# Patient Record
Sex: FEMALE | Race: WHITE | NOT HISPANIC OR LATINO | Employment: OTHER | URBAN - METROPOLITAN AREA
[De-identification: names, ages, dates, MRNs, and addresses within clinical notes are randomized per-mention and may not be internally consistent; named-entity substitution may affect disease eponyms.]

---

## 2023-08-15 ENCOUNTER — HOSPITAL ENCOUNTER (OUTPATIENT)
Dept: DATA CONVERSION | Facility: HOSPITAL | Age: 73
Discharge: HOME | End: 2023-08-15

## 2023-08-15 DIAGNOSIS — M85.89 OTHER SPECIFIED DISORDERS OF BONE DENSITY AND STRUCTURE, MULTIPLE SITES: ICD-10-CM

## 2023-09-25 ENCOUNTER — LAB (OUTPATIENT)
Dept: LAB | Facility: LAB | Age: 73
End: 2023-09-25
Payer: MEDICARE

## 2023-09-25 ENCOUNTER — OFFICE VISIT (OUTPATIENT)
Dept: PRIMARY CARE | Facility: CLINIC | Age: 73
End: 2023-09-25
Payer: MEDICARE

## 2023-09-25 VITALS
HEIGHT: 62 IN | SYSTOLIC BLOOD PRESSURE: 122 MMHG | WEIGHT: 187.2 LBS | RESPIRATION RATE: 17 BRPM | BODY MASS INDEX: 34.45 KG/M2 | DIASTOLIC BLOOD PRESSURE: 80 MMHG | HEART RATE: 78 BPM | OXYGEN SATURATION: 98 %

## 2023-09-25 DIAGNOSIS — M19.90 OSTEOARTHRITIS, UNSPECIFIED OSTEOARTHRITIS TYPE, UNSPECIFIED SITE: ICD-10-CM

## 2023-09-25 DIAGNOSIS — R76.8 ANA POSITIVE: ICD-10-CM

## 2023-09-25 DIAGNOSIS — F17.210 TOBACCO DEPENDENCE DUE TO CIGARETTES: ICD-10-CM

## 2023-09-25 DIAGNOSIS — Z23 ENCOUNTER FOR IMMUNIZATION: ICD-10-CM

## 2023-09-25 DIAGNOSIS — E78.2 MIXED HYPERLIPIDEMIA: ICD-10-CM

## 2023-09-25 DIAGNOSIS — Z12.31 SCREENING MAMMOGRAM FOR BREAST CANCER: ICD-10-CM

## 2023-09-25 DIAGNOSIS — Z00.00 ROUTINE GENERAL MEDICAL EXAMINATION AT HEALTH CARE FACILITY: Primary | ICD-10-CM

## 2023-09-25 DIAGNOSIS — M06.4 UNDIFFERENTIATED INFLAMMATORY ARTHRITIS (MULTI): ICD-10-CM

## 2023-09-25 DIAGNOSIS — Z72.0 TOBACCO USE: ICD-10-CM

## 2023-09-25 PROBLEM — L40.0 PSORIASIS VULGARIS: Status: ACTIVE | Noted: 2022-10-14

## 2023-09-25 PROBLEM — L98.9 SKIN LESION: Status: RESOLVED | Noted: 2023-09-25 | Resolved: 2023-09-25

## 2023-09-25 PROBLEM — L23.7 POISON IVY DERMATITIS: Status: RESOLVED | Noted: 2023-09-25 | Resolved: 2023-09-25

## 2023-09-25 PROBLEM — S01.81XA FOREHEAD LACERATION, INITIAL ENCOUNTER: Status: ACTIVE | Noted: 2023-09-25

## 2023-09-25 PROBLEM — L23.7 POISON IVY DERMATITIS: Status: ACTIVE | Noted: 2023-09-25

## 2023-09-25 PROBLEM — D22.30 MELANOCYTIC NEVI OF UNSPECIFIED PART OF FACE: Status: ACTIVE | Noted: 2022-10-14

## 2023-09-25 PROBLEM — R22.9 LUMP OF SKIN: Status: RESOLVED | Noted: 2023-09-25 | Resolved: 2023-09-25

## 2023-09-25 PROBLEM — L81.4 OTHER MELANIN HYPERPIGMENTATION: Status: ACTIVE | Noted: 2022-10-14

## 2023-09-25 PROBLEM — N17.9 AKI (ACUTE KIDNEY INJURY) (CMS-HCC): Status: RESOLVED | Noted: 2023-09-25 | Resolved: 2023-09-25

## 2023-09-25 PROBLEM — M25.561 RIGHT KNEE PAIN: Status: ACTIVE | Noted: 2023-09-25

## 2023-09-25 PROBLEM — D18.01 HEMANGIOMA OF SKIN AND SUBCUTANEOUS TISSUE: Status: ACTIVE | Noted: 2022-10-14

## 2023-09-25 PROBLEM — R19.7 DIARRHEA: Status: RESOLVED | Noted: 2023-09-25 | Resolved: 2023-09-25

## 2023-09-25 PROBLEM — M85.89 OTHER SPECIFIED DISORDERS OF BONE DENSITY AND STRUCTURE, MULTIPLE SITES: Status: ACTIVE | Noted: 2023-09-25

## 2023-09-25 PROBLEM — E66.9 OBESITY: Status: ACTIVE | Noted: 2023-09-25

## 2023-09-25 PROBLEM — R22.9 LUMP OF SKIN: Status: ACTIVE | Noted: 2023-09-25

## 2023-09-25 PROBLEM — R22.32 LOCALIZED SWELLING OF BOTH HANDS: Status: ACTIVE | Noted: 2023-09-25

## 2023-09-25 PROBLEM — M79.604 PAIN OF RIGHT LOWER EXTREMITY: Status: ACTIVE | Noted: 2023-09-25

## 2023-09-25 PROBLEM — R09.89 DECREASED PULSES IN FEET: Status: ACTIVE | Noted: 2023-09-25

## 2023-09-25 PROBLEM — M79.606 LEG PAIN: Status: ACTIVE | Noted: 2023-09-25

## 2023-09-25 PROBLEM — M75.101 ROTATOR CUFF SYNDROME OF RIGHT SHOULDER: Status: ACTIVE | Noted: 2023-09-25

## 2023-09-25 PROBLEM — L91.8 OTHER HYPERTROPHIC DISORDERS OF THE SKIN: Status: ACTIVE | Noted: 2022-10-14

## 2023-09-25 PROBLEM — L98.9 SKIN LESION: Status: ACTIVE | Noted: 2023-09-25

## 2023-09-25 PROBLEM — L23.7 CONTACT DERMATITIS DUE TO POISON OAK: Status: RESOLVED | Noted: 2023-09-25 | Resolved: 2023-09-25

## 2023-09-25 PROBLEM — L23.7 CONTACT DERMATITIS DUE TO POISON OAK: Status: ACTIVE | Noted: 2023-09-25

## 2023-09-25 PROBLEM — M25.511 RIGHT SHOULDER PAIN: Status: ACTIVE | Noted: 2023-09-25

## 2023-09-25 PROBLEM — S05.00XA CORNEAL ABRASION: Status: ACTIVE | Noted: 2023-09-25

## 2023-09-25 PROBLEM — S01.81XA FACIAL LACERATION: Status: ACTIVE | Noted: 2023-09-25

## 2023-09-25 PROBLEM — N17.9 AKI (ACUTE KIDNEY INJURY) (CMS-HCC): Status: ACTIVE | Noted: 2023-09-25

## 2023-09-25 PROBLEM — F17.200 TOBACCO USE DISORDER: Status: ACTIVE | Noted: 2023-09-25

## 2023-09-25 PROBLEM — L01.00 IMPETIGO: Status: ACTIVE | Noted: 2023-09-25

## 2023-09-25 PROBLEM — R19.7 DIARRHEA: Status: ACTIVE | Noted: 2023-09-25

## 2023-09-25 PROBLEM — M79.643 HAND PAIN: Status: ACTIVE | Noted: 2023-09-25

## 2023-09-25 PROBLEM — R22.31 LOCALIZED SWELLING OF BOTH HANDS: Status: ACTIVE | Noted: 2023-09-25

## 2023-09-25 PROBLEM — L82.1 OTHER SEBORRHEIC KERATOSIS: Status: ACTIVE | Noted: 2022-10-14

## 2023-09-25 PROBLEM — M54.50 LOW BACK PAIN: Status: ACTIVE | Noted: 2023-09-25

## 2023-09-25 PROBLEM — S01.81XA FOREHEAD LACERATION, INITIAL ENCOUNTER: Status: RESOLVED | Noted: 2023-09-25 | Resolved: 2023-09-25

## 2023-09-25 PROBLEM — L25.9 DERMATITIS, CONTACT: Status: ACTIVE | Noted: 2023-09-25

## 2023-09-25 LAB
ALANINE AMINOTRANSFERASE (SGPT) (U/L) IN SER/PLAS: 14 U/L (ref 7–45)
ALBUMIN (G/DL) IN SER/PLAS: 3.9 G/DL (ref 3.4–5)
ALKALINE PHOSPHATASE (U/L) IN SER/PLAS: 64 U/L (ref 33–136)
ANION GAP IN SER/PLAS: 13 MMOL/L (ref 10–20)
ASPARTATE AMINOTRANSFERASE (SGOT) (U/L) IN SER/PLAS: 17 U/L (ref 9–39)
BASOPHILS (10*3/UL) IN BLOOD BY AUTOMATED COUNT: 0.08 X10E9/L (ref 0–0.1)
BASOPHILS/100 LEUKOCYTES IN BLOOD BY AUTOMATED COUNT: 1 % (ref 0–2)
BILIRUBIN TOTAL (MG/DL) IN SER/PLAS: 0.6 MG/DL (ref 0–1.2)
CALCIUM (MG/DL) IN SER/PLAS: 9.2 MG/DL (ref 8.6–10.3)
CARBON DIOXIDE, TOTAL (MMOL/L) IN SER/PLAS: 26 MMOL/L (ref 21–32)
CHLORIDE (MMOL/L) IN SER/PLAS: 105 MMOL/L (ref 98–107)
CHOLESTEROL (MG/DL) IN SER/PLAS: 189 MG/DL (ref 0–199)
CHOLESTEROL IN HDL (MG/DL) IN SER/PLAS: 49.9 MG/DL
CHOLESTEROL/HDL RATIO: 3.8
CREATININE (MG/DL) IN SER/PLAS: 0.93 MG/DL (ref 0.5–1.05)
EOSINOPHILS (10*3/UL) IN BLOOD BY AUTOMATED COUNT: 0.25 X10E9/L (ref 0–0.4)
EOSINOPHILS/100 LEUKOCYTES IN BLOOD BY AUTOMATED COUNT: 3.2 % (ref 0–6)
ERYTHROCYTE DISTRIBUTION WIDTH (RATIO) BY AUTOMATED COUNT: 12 % (ref 11.5–14.5)
ERYTHROCYTE MEAN CORPUSCULAR HEMOGLOBIN CONCENTRATION (G/DL) BY AUTOMATED: 32.4 G/DL (ref 32–36)
ERYTHROCYTE MEAN CORPUSCULAR VOLUME (FL) BY AUTOMATED COUNT: 92 FL (ref 80–100)
ERYTHROCYTES (10*6/UL) IN BLOOD BY AUTOMATED COUNT: 4.59 X10E12/L (ref 4–5.2)
GFR FEMALE: 64 ML/MIN/1.73M2
GLUCOSE (MG/DL) IN SER/PLAS: 83 MG/DL (ref 74–99)
HEMATOCRIT (%) IN BLOOD BY AUTOMATED COUNT: 42.3 % (ref 36–46)
HEMOGLOBIN (G/DL) IN BLOOD: 13.7 G/DL (ref 12–16)
IMMATURE GRANULOCYTES/100 LEUKOCYTES IN BLOOD BY AUTOMATED COUNT: 0.3 % (ref 0–0.9)
LDL: 111 MG/DL (ref 0–99)
LEUKOCYTES (10*3/UL) IN BLOOD BY AUTOMATED COUNT: 7.8 X10E9/L (ref 4.4–11.3)
LYMPHOCYTES (10*3/UL) IN BLOOD BY AUTOMATED COUNT: 2.18 X10E9/L (ref 0.8–3)
LYMPHOCYTES/100 LEUKOCYTES IN BLOOD BY AUTOMATED COUNT: 27.9 % (ref 13–44)
MONOCYTES (10*3/UL) IN BLOOD BY AUTOMATED COUNT: 0.51 X10E9/L (ref 0.05–0.8)
MONOCYTES/100 LEUKOCYTES IN BLOOD BY AUTOMATED COUNT: 6.5 % (ref 2–10)
NEUTROPHILS (10*3/UL) IN BLOOD BY AUTOMATED COUNT: 4.76 X10E9/L (ref 1.6–5.5)
NEUTROPHILS/100 LEUKOCYTES IN BLOOD BY AUTOMATED COUNT: 61.1 % (ref 40–80)
PLATELETS (10*3/UL) IN BLOOD AUTOMATED COUNT: 286 X10E9/L (ref 150–450)
POTASSIUM (MMOL/L) IN SER/PLAS: 4.2 MMOL/L (ref 3.5–5.3)
PROTEIN TOTAL: 6.5 G/DL (ref 6.4–8.2)
SODIUM (MMOL/L) IN SER/PLAS: 140 MMOL/L (ref 136–145)
TRIGLYCERIDE (MG/DL) IN SER/PLAS: 142 MG/DL (ref 0–149)
UREA NITROGEN (MG/DL) IN SER/PLAS: 16 MG/DL (ref 6–23)
VLDL: 28 MG/DL (ref 0–40)

## 2023-09-25 PROCEDURE — 1159F MED LIST DOCD IN RCRD: CPT | Performed by: STUDENT IN AN ORGANIZED HEALTH CARE EDUCATION/TRAINING PROGRAM

## 2023-09-25 PROCEDURE — 80061 LIPID PANEL: CPT

## 2023-09-25 PROCEDURE — 36415 COLL VENOUS BLD VENIPUNCTURE: CPT

## 2023-09-25 PROCEDURE — G0439 PPPS, SUBSEQ VISIT: HCPCS | Performed by: STUDENT IN AN ORGANIZED HEALTH CARE EDUCATION/TRAINING PROGRAM

## 2023-09-25 PROCEDURE — 99214 OFFICE O/P EST MOD 30 MIN: CPT | Performed by: STUDENT IN AN ORGANIZED HEALTH CARE EDUCATION/TRAINING PROGRAM

## 2023-09-25 PROCEDURE — 1160F RVW MEDS BY RX/DR IN RCRD: CPT | Performed by: STUDENT IN AN ORGANIZED HEALTH CARE EDUCATION/TRAINING PROGRAM

## 2023-09-25 PROCEDURE — G0008 ADMIN INFLUENZA VIRUS VAC: HCPCS | Performed by: STUDENT IN AN ORGANIZED HEALTH CARE EDUCATION/TRAINING PROGRAM

## 2023-09-25 PROCEDURE — 80053 COMPREHEN METABOLIC PANEL: CPT

## 2023-09-25 PROCEDURE — 90662 IIV NO PRSV INCREASED AG IM: CPT | Performed by: STUDENT IN AN ORGANIZED HEALTH CARE EDUCATION/TRAINING PROGRAM

## 2023-09-25 PROCEDURE — 1170F FXNL STATUS ASSESSED: CPT | Performed by: STUDENT IN AN ORGANIZED HEALTH CARE EDUCATION/TRAINING PROGRAM

## 2023-09-25 PROCEDURE — 85025 COMPLETE CBC W/AUTO DIFF WBC: CPT

## 2023-09-25 RX ORDER — ACETAMINOPHEN 500 MG
TABLET ORAL
COMMUNITY
Start: 2021-10-05

## 2023-09-25 RX ORDER — HYDROXYCHLOROQUINE SULFATE 200 MG/1
1 TABLET, FILM COATED ORAL 2 TIMES DAILY
COMMUNITY
End: 2023-12-13

## 2023-09-25 ASSESSMENT — ENCOUNTER SYMPTOMS
FREQUENCY: 1
SHORTNESS OF BREATH: 0
ABDOMINAL PAIN: 0
COUGH: 0
WOUND: 0
CONSTIPATION: 0
ARTHRALGIAS: 1

## 2023-09-25 ASSESSMENT — ACTIVITIES OF DAILY LIVING (ADL)
MANAGING_FINANCES: INDEPENDENT
BATHING: INDEPENDENT
DOING_HOUSEWORK: INDEPENDENT
DRESSING: INDEPENDENT
GROCERY_SHOPPING: INDEPENDENT
TAKING_MEDICATION: INDEPENDENT

## 2023-09-25 ASSESSMENT — PATIENT HEALTH QUESTIONNAIRE - PHQ9
2. FEELING DOWN, DEPRESSED OR HOPELESS: NOT AT ALL
1. LITTLE INTEREST OR PLEASURE IN DOING THINGS: NOT AT ALL
SUM OF ALL RESPONSES TO PHQ9 QUESTIONS 1 AND 2: 0

## 2023-09-25 NOTE — PROGRESS NOTES
"Subjective   Reason for Visit: Sabrina Alcazar is an 73 y.o. female here for a Medicare Wellness visit.          Reviewed all medications by prescribing practitioner or clinical pharmacist (such as prescriptions, OTCs, herbal therapies and supplements) and documented in the medical record.    Patient presents today for her Medicare wellness visit.  Patient states she is currently working with Dr. Lucio regarding her arthritis.  She did have a DEXA scan which was normal.  She was started on Plaquenil.  This has not had a benefit to managing her arthritis.  She has noticed a decrease in pain and a overall decrease in synovitis along multiple joints.  However, she is hesitant to continue the Plaquenil and possibly wants to discontinue it and try taking just Tylenol.      Patient also states that she is struggling maintaining her weight.  She has a lack of motivation when it comes to food in has been embarrassed to continue her exercising due to her generalized deconditioned state.  She was doing a walking program with the [employer] which she really enjoyed but stopped due to previously mentioned concerns.    Insurance did update her colonoscopy screening of fit test we will need to obtain those results        Patient Care Team:  Lisa Washington DO as PCP - General (Family Medicine)  Lisa Washington DO as PCP - [employer] PCP  Drew Hagen DO as Primary Care Provider     Review of Systems   HENT:  Negative for congestion.    Respiratory:  Negative for cough and shortness of breath.    Cardiovascular:  Negative for chest pain.   Gastrointestinal:  Negative for abdominal pain and constipation.   Genitourinary:  Positive for frequency.   Musculoskeletal:  Positive for arthralgias.   Skin:  Negative for rash and wound.       Objective   Vitals:  /80   Pulse 78   Resp 17   Ht 1.575 m (5' 2\")   Wt 84.9 kg (187 lb 3.2 oz)   SpO2 98%   BMI 34.24 kg/m²       Physical Exam  Vitals " reviewed.   Constitutional:       General: She is not in acute distress.     Appearance: Normal appearance. She is not ill-appearing.   HENT:      Right Ear: Tympanic membrane and ear canal normal.      Left Ear: Tympanic membrane and ear canal normal.      Mouth/Throat:      Mouth: Mucous membranes are moist.      Pharynx: Oropharynx is clear. No oropharyngeal exudate or posterior oropharyngeal erythema.   Eyes:      Extraocular Movements: Extraocular movements intact.      Conjunctiva/sclera: Conjunctivae normal.      Pupils: Pupils are equal, round, and reactive to light.   Neck:      Vascular: No carotid bruit.   Cardiovascular:      Rate and Rhythm: Normal rate and regular rhythm.      Heart sounds: No murmur heard.     No gallop.   Pulmonary:      Effort: Pulmonary effort is normal.      Breath sounds: Normal breath sounds. No wheezing, rhonchi or rales.   Abdominal:      General: Abdomen is flat. Bowel sounds are normal.      Palpations: Abdomen is soft.      Tenderness: There is no abdominal tenderness.   Musculoskeletal:      Cervical back: Neck supple.      Left lower leg: No edema.   Skin:     General: Skin is warm and dry.      Findings: No rash.   Neurological:      General: No focal deficit present.      Mental Status: She is alert and oriented to person, place, and time.      Gait: Gait normal.   Psychiatric:         Mood and Affect: Mood normal.         Behavior: Behavior normal.       Assessment/Plan   Problem List Items Addressed This Visit       JARRET positive    Relevant Orders    CBC and Auto Differential    Comprehensive metabolic panel    Mixed hyperlipidemia    Relevant Orders    CBC and Auto Differential    Comprehensive metabolic panel    Lipid Panel    Osteoarthritis    Relevant Orders    CBC and Auto Differential    Comprehensive metabolic panel     Other Visit Diagnoses       Routine general medical examination at health care facility    -  Primary    Encounter for immunization         Relevant Orders    Flu vaccine, quadrivalent, high-dose, preservative free, age 65y+ (FLUZONE)    Screening mammogram for breast cancer        Relevant Orders    BI mammo bilateral screening tomosynthesis               Inflammatory Arthritis   She initially felt much better with the medications, less synovitis   hydroxychloroquine 200mg BID  followed by rheum with Dr. Mirza  advised annual vision screening     Weight Gain   Offered nutritionist   Encouraged more movement and activity   Pt declined at this time.     HLD  ordered lipid panel      Tobacco Use  We did have an extensive discussion regarding smoking cessation with varies ways to help her quit, including gums, patches and Phamacological avenues. We also discussed proper diet that may improve his overall health and well being. WE advised the patient to make healthy food choices and trial a largely plant based diet. He was advised to avoid processed foods and sweets.        Health Maintenance  Mammogram ordered  influenza vaccine given today   colon ca screenin colonoscopy, insurance sent FIT testing this year, will try to obtain the results.     fu in 6 months    Please send copy to her hand

## 2023-10-04 ENCOUNTER — APPOINTMENT (OUTPATIENT)
Dept: RADIOLOGY | Facility: HOSPITAL | Age: 73
End: 2023-10-04
Payer: MEDICARE

## 2023-10-20 ENCOUNTER — HOSPITAL ENCOUNTER (OUTPATIENT)
Dept: RADIOLOGY | Facility: HOSPITAL | Age: 73
Discharge: HOME | End: 2023-10-20
Payer: MEDICARE

## 2023-10-20 DIAGNOSIS — F17.210 TOBACCO DEPENDENCE DUE TO CIGARETTES: ICD-10-CM

## 2023-10-20 DIAGNOSIS — Z72.0 TOBACCO USE: ICD-10-CM

## 2023-10-20 PROCEDURE — 71271 CT THORAX LUNG CANCER SCR C-: CPT | Mod: MG

## 2023-12-13 DIAGNOSIS — M06.4 UNDIFFERENTIATED INFLAMMATORY ARTHRITIS (MULTI): Primary | ICD-10-CM

## 2023-12-13 RX ORDER — HYDROXYCHLOROQUINE SULFATE 200 MG/1
TABLET, FILM COATED ORAL 2 TIMES DAILY
Qty: 60 TABLET | Refills: 5 | Status: SHIPPED | OUTPATIENT
Start: 2023-12-13 | End: 2024-01-02 | Stop reason: SDUPTHER

## 2024-01-02 DIAGNOSIS — M06.4 UNDIFFERENTIATED INFLAMMATORY ARTHRITIS (MULTI): ICD-10-CM

## 2024-01-02 RX ORDER — HYDROXYCHLOROQUINE SULFATE 200 MG/1
200 TABLET, FILM COATED ORAL 2 TIMES DAILY
Qty: 60 TABLET | Refills: 5 | Status: SHIPPED | OUTPATIENT
Start: 2024-01-02

## 2024-06-17 ENCOUNTER — OFFICE VISIT (OUTPATIENT)
Dept: RHEUMATOLOGY | Facility: CLINIC | Age: 74
End: 2024-06-17
Payer: MEDICARE

## 2024-06-17 VITALS
BODY MASS INDEX: 34.93 KG/M2 | DIASTOLIC BLOOD PRESSURE: 72 MMHG | HEIGHT: 61 IN | SYSTOLIC BLOOD PRESSURE: 128 MMHG | WEIGHT: 185 LBS

## 2024-06-17 DIAGNOSIS — L40.0 PSORIASIS VULGARIS: ICD-10-CM

## 2024-06-17 DIAGNOSIS — M06.4 UNDIFFERENTIATED INFLAMMATORY ARTHRITIS (MULTI): ICD-10-CM

## 2024-06-17 DIAGNOSIS — M19.90 OSTEOARTHRITIS, UNSPECIFIED OSTEOARTHRITIS TYPE, UNSPECIFIED SITE: Primary | ICD-10-CM

## 2024-06-17 PROCEDURE — 99214 OFFICE O/P EST MOD 30 MIN: CPT | Performed by: INTERNAL MEDICINE

## 2024-06-17 RX ORDER — HYDROXYCHLOROQUINE SULFATE 200 MG/1
200 TABLET, FILM COATED ORAL 2 TIMES DAILY
Qty: 60 TABLET | Refills: 5 | Status: SHIPPED | OUTPATIENT
Start: 2024-06-17

## 2024-06-17 NOTE — PROGRESS NOTES
Recheck  OA  /  UCTD  /  Inflammatory Arthritis   Good days, bad days    HPI - She has a lot of back/buttock stiffness and some pain, sometimes rad into her thigh.  She didn't go to PT.   She takes tylenol ES 2 tab bid with relief.  No other partic pain.  No swelling.  AM stiffness in back few min.  Sometimes walks bent over.  No CP, some ARREDONDO with exercise.  No GI.  Sees eye dr.      PE  NAD  RRR no r/m/g  CTA  Noedema  No synovitis  Sl red scaly area B shines and R olecranon <1% BSA    A/P - OA and inflam arthritis - back stiffness>pain  She saw derm and was told she ha Ps but didn't make recommendations.  She was not given any topicals.  ?if she could have PsA.  She is doing well on hcq and has no evidence of active inflammation.  Recommend she go back for Ps and for full body check  Nl DEXA 8/23  She declines PT  Reviewed CMP and CBC from primary 9/23  Reeval 1 yr or sooner PRN

## 2024-09-26 ENCOUNTER — HOSPITAL ENCOUNTER (OUTPATIENT)
Dept: RADIOLOGY | Facility: HOSPITAL | Age: 74
Discharge: HOME | End: 2024-09-26
Payer: MEDICARE

## 2024-09-26 ENCOUNTER — APPOINTMENT (OUTPATIENT)
Dept: PRIMARY CARE | Facility: CLINIC | Age: 74
End: 2024-09-26
Payer: MEDICARE

## 2024-09-26 ENCOUNTER — LAB (OUTPATIENT)
Dept: LAB | Facility: LAB | Age: 74
End: 2024-09-26
Payer: MEDICARE

## 2024-09-26 VITALS
DIASTOLIC BLOOD PRESSURE: 64 MMHG | HEART RATE: 70 BPM | HEIGHT: 63 IN | OXYGEN SATURATION: 96 % | BODY MASS INDEX: 32.43 KG/M2 | WEIGHT: 183 LBS | SYSTOLIC BLOOD PRESSURE: 126 MMHG

## 2024-09-26 VITALS — BODY MASS INDEX: 31.89 KG/M2 | HEIGHT: 63 IN | WEIGHT: 180 LBS

## 2024-09-26 DIAGNOSIS — Z86.39 H/O: OBESITY: ICD-10-CM

## 2024-09-26 DIAGNOSIS — E78.2 MIXED HYPERLIPIDEMIA: ICD-10-CM

## 2024-09-26 DIAGNOSIS — E66.09 CLASS 1 OBESITY DUE TO EXCESS CALORIES WITH SERIOUS COMORBIDITY AND BODY MASS INDEX (BMI) OF 32.0 TO 32.9 IN ADULT: ICD-10-CM

## 2024-09-26 DIAGNOSIS — M06.4 UNDIFFERENTIATED INFLAMMATORY ARTHRITIS (MULTI): ICD-10-CM

## 2024-09-26 DIAGNOSIS — Z00.00 ROUTINE GENERAL MEDICAL EXAMINATION AT HEALTH CARE FACILITY: Primary | ICD-10-CM

## 2024-09-26 DIAGNOSIS — Z12.31 ENCOUNTER FOR SCREENING MAMMOGRAM FOR MALIGNANT NEOPLASM OF BREAST: ICD-10-CM

## 2024-09-26 DIAGNOSIS — F17.210 TOBACCO DEPENDENCE DUE TO CIGARETTES: ICD-10-CM

## 2024-09-26 DIAGNOSIS — M54.32 SCIATICA OF LEFT SIDE: ICD-10-CM

## 2024-09-26 DIAGNOSIS — Z23 IMMUNIZATION DUE: ICD-10-CM

## 2024-09-26 DIAGNOSIS — F17.200 TOBACCO USE DISORDER: ICD-10-CM

## 2024-09-26 PROBLEM — S05.00XA CORNEAL ABRASION: Status: RESOLVED | Noted: 2023-09-25 | Resolved: 2024-09-26

## 2024-09-26 LAB
25(OH)D3 SERPL-MCNC: 27 NG/ML (ref 30–100)
ALBUMIN SERPL BCP-MCNC: 4 G/DL (ref 3.4–5)
ALP SERPL-CCNC: 75 U/L (ref 33–136)
ALT SERPL W P-5'-P-CCNC: 18 U/L (ref 7–45)
ANION GAP SERPL CALC-SCNC: 12 MMOL/L (ref 10–20)
AST SERPL W P-5'-P-CCNC: 20 U/L (ref 9–39)
BASOPHILS # BLD AUTO: 0.07 X10*3/UL (ref 0–0.1)
BASOPHILS NFR BLD AUTO: 0.8 %
BILIRUB SERPL-MCNC: 0.6 MG/DL (ref 0–1.2)
BUN SERPL-MCNC: 14 MG/DL (ref 6–23)
CALCIUM SERPL-MCNC: 9.4 MG/DL (ref 8.6–10.3)
CHLORIDE SERPL-SCNC: 105 MMOL/L (ref 98–107)
CHOLEST SERPL-MCNC: 187 MG/DL (ref 0–199)
CHOLESTEROL/HDL RATIO: 3.9
CO2 SERPL-SCNC: 27 MMOL/L (ref 21–32)
CREAT SERPL-MCNC: 0.95 MG/DL (ref 0.5–1.05)
EGFRCR SERPLBLD CKD-EPI 2021: 62 ML/MIN/1.73M*2
EOSINOPHIL # BLD AUTO: 0.27 X10*3/UL (ref 0–0.4)
EOSINOPHIL NFR BLD AUTO: 3.2 %
ERYTHROCYTE [DISTWIDTH] IN BLOOD BY AUTOMATED COUNT: 12 % (ref 11.5–14.5)
EST. AVERAGE GLUCOSE BLD GHB EST-MCNC: 97 MG/DL
GLUCOSE SERPL-MCNC: 88 MG/DL (ref 74–99)
HBA1C MFR BLD: 5 %
HCT VFR BLD AUTO: 43 % (ref 36–46)
HDLC SERPL-MCNC: 48.4 MG/DL
HGB BLD-MCNC: 14.5 G/DL (ref 12–16)
IMM GRANULOCYTES # BLD AUTO: 0.02 X10*3/UL (ref 0–0.5)
IMM GRANULOCYTES NFR BLD AUTO: 0.2 % (ref 0–0.9)
LDLC SERPL CALC-MCNC: 95 MG/DL
LYMPHOCYTES # BLD AUTO: 2.58 X10*3/UL (ref 0.8–3)
LYMPHOCYTES NFR BLD AUTO: 30.8 %
MCH RBC QN AUTO: 30.9 PG (ref 26–34)
MCHC RBC AUTO-ENTMCNC: 33.7 G/DL (ref 32–36)
MCV RBC AUTO: 92 FL (ref 80–100)
MONOCYTES # BLD AUTO: 0.5 X10*3/UL (ref 0.05–0.8)
MONOCYTES NFR BLD AUTO: 6 %
NEUTROPHILS # BLD AUTO: 4.93 X10*3/UL (ref 1.6–5.5)
NEUTROPHILS NFR BLD AUTO: 59 %
NON HDL CHOLESTEROL: 139 MG/DL (ref 0–149)
NRBC BLD-RTO: 0 /100 WBCS (ref 0–0)
PLATELET # BLD AUTO: 309 X10*3/UL (ref 150–450)
POTASSIUM SERPL-SCNC: 4.1 MMOL/L (ref 3.5–5.3)
PROT SERPL-MCNC: 6.4 G/DL (ref 6.4–8.2)
RBC # BLD AUTO: 4.7 X10*6/UL (ref 4–5.2)
SODIUM SERPL-SCNC: 140 MMOL/L (ref 136–145)
TRIGL SERPL-MCNC: 218 MG/DL (ref 0–149)
TSH SERPL-ACNC: 3.24 MIU/L (ref 0.44–3.98)
VLDL: 44 MG/DL (ref 0–40)
WBC # BLD AUTO: 8.4 X10*3/UL (ref 4.4–11.3)

## 2024-09-26 PROCEDURE — G0008 ADMIN INFLUENZA VIRUS VAC: HCPCS | Performed by: STUDENT IN AN ORGANIZED HEALTH CARE EDUCATION/TRAINING PROGRAM

## 2024-09-26 PROCEDURE — 80061 LIPID PANEL: CPT

## 2024-09-26 PROCEDURE — 77067 SCR MAMMO BI INCL CAD: CPT

## 2024-09-26 PROCEDURE — G0439 PPPS, SUBSEQ VISIT: HCPCS | Performed by: STUDENT IN AN ORGANIZED HEALTH CARE EDUCATION/TRAINING PROGRAM

## 2024-09-26 PROCEDURE — 1160F RVW MEDS BY RX/DR IN RCRD: CPT | Performed by: STUDENT IN AN ORGANIZED HEALTH CARE EDUCATION/TRAINING PROGRAM

## 2024-09-26 PROCEDURE — 80053 COMPREHEN METABOLIC PANEL: CPT

## 2024-09-26 PROCEDURE — 1123F ACP DISCUSS/DSCN MKR DOCD: CPT | Performed by: STUDENT IN AN ORGANIZED HEALTH CARE EDUCATION/TRAINING PROGRAM

## 2024-09-26 PROCEDURE — 1159F MED LIST DOCD IN RCRD: CPT | Performed by: STUDENT IN AN ORGANIZED HEALTH CARE EDUCATION/TRAINING PROGRAM

## 2024-09-26 PROCEDURE — 1170F FXNL STATUS ASSESSED: CPT | Performed by: STUDENT IN AN ORGANIZED HEALTH CARE EDUCATION/TRAINING PROGRAM

## 2024-09-26 PROCEDURE — 36415 COLL VENOUS BLD VENIPUNCTURE: CPT

## 2024-09-26 PROCEDURE — 82306 VITAMIN D 25 HYDROXY: CPT

## 2024-09-26 PROCEDURE — 90662 IIV NO PRSV INCREASED AG IM: CPT | Performed by: STUDENT IN AN ORGANIZED HEALTH CARE EDUCATION/TRAINING PROGRAM

## 2024-09-26 PROCEDURE — 84443 ASSAY THYROID STIM HORMONE: CPT

## 2024-09-26 PROCEDURE — 83036 HEMOGLOBIN GLYCOSYLATED A1C: CPT

## 2024-09-26 PROCEDURE — 99214 OFFICE O/P EST MOD 30 MIN: CPT | Performed by: STUDENT IN AN ORGANIZED HEALTH CARE EDUCATION/TRAINING PROGRAM

## 2024-09-26 PROCEDURE — 85025 COMPLETE CBC W/AUTO DIFF WBC: CPT

## 2024-09-26 RX ORDER — METHYLPREDNISOLONE 4 MG/1
TABLET ORAL
Qty: 21 TABLET | Refills: 0 | Status: SHIPPED | OUTPATIENT
Start: 2024-09-26 | End: 2024-10-03

## 2024-09-26 ASSESSMENT — ACTIVITIES OF DAILY LIVING (ADL)
DOING_HOUSEWORK: INDEPENDENT
MANAGING_FINANCES: INDEPENDENT
TAKING_MEDICATION: INDEPENDENT
DRESSING: INDEPENDENT
BATHING: INDEPENDENT
GROCERY_SHOPPING: INDEPENDENT

## 2024-09-26 NOTE — PROGRESS NOTES
History Of Present Illness  Sabrina Alcazar is a 74 y.o. female presents    Left leg sciatica - presented to urgent care.  Improving with muscle relaxant 8/12/24  Walking with a cane     Past Medical History  She has a past medical history of Asymptomatic menopausal state (06/17/2013), Encounter for screening for malignant neoplasm of vagina, Other conditions influencing health status, Pain in unspecified joint, Personal history of other complications of pregnancy, childbirth and the puerperium, Personal history of other diseases of the female genital tract, Personal history of other diseases of the musculoskeletal system and connective tissue, Personal history of other diseases of the musculoskeletal system and connective tissue, Personal history of other infectious and parasitic diseases, Personal history of other specified conditions, Unspecified urinary incontinence, and Urgency of urination.    Surgical History  She has a past surgical history that includes Dilation and curettage of uterus (10/08/2013); Colonoscopy (03/17/2014); and Mouth surgery (05/12/2014).     Social History  She reports that she has been smoking cigarettes. She has a 10 pack-year smoking history. She has never used smokeless tobacco. She reports current alcohol use. She reports that she does not use drugs.    Family History  Family History   Problem Relation Name Age of Onset   • No Known Problems Mother     • No Known Problems Father     • Ovarian cancer Sister          Allergies  Meperidine    Physical Exam  Vitals reviewed.   Constitutional:       General: She is not in acute distress.     Appearance: She is not ill-appearing.   HENT:      Right Ear: Tympanic membrane and ear canal normal.      Left Ear: Tympanic membrane and ear canal normal.      Mouth/Throat:      Mouth: Mucous membranes are moist.      Pharynx: Oropharynx is clear. No oropharyngeal exudate or posterior oropharyngeal erythema.   Eyes:      Extraocular Movements:  Extraocular movements intact.      Conjunctiva/sclera: Conjunctivae normal.      Pupils: Pupils are equal, round, and reactive to light.   Neck:      Vascular: No carotid bruit.   Cardiovascular:      Rate and Rhythm: Normal rate and regular rhythm.      Heart sounds: No murmur heard.     No gallop.   Pulmonary:      Effort: Pulmonary effort is normal.      Breath sounds: Normal breath sounds. No wheezing, rhonchi or rales.   Abdominal:      General: Abdomen is flat. Bowel sounds are normal.      Palpations: Abdomen is soft.      Tenderness: There is no abdominal tenderness.   Musculoskeletal:      Cervical back: Neck supple.      Left lower leg: No edema.   Skin:     General: Skin is warm and dry.      Findings: No rash.   Neurological:      General: No focal deficit present.      Mental Status: She is alert and oriented to person, place, and time.      Gait: Gait abnormal.   Psychiatric:         Mood and Affect: Mood normal.         Behavior: Behavior normal.        Last Recorded Vitals  /64   Pulse 70   Wt 83 kg (183 lb)   SpO2 96%     Relevant Results    Current Outpatient Medications:   •  acetaminophen (Tylenol Extra Strength) 500 mg tablet, Take by mouth., Disp: , Rfl:   •  hydroxychloroquine (Plaquenil) 200 mg tablet, Take 1 tablet (200 mg) by mouth 2 times a day., Disp: 60 tablet, Rfl: 5  •  methylPREDNISolone (Medrol Dospak) 4 mg tablets, Take as directed on package., Disp: 21 tablet, Rfl: 0           Assessment/Plan   Diagnoses and all orders for this visit:  Routine general medical examination at health care facility  Undifferentiated inflammatory arthritis (Multi)  -     Disability Placard  Sciatica of left side  -     Referral to Physical Therapy; Future  -     methylPREDNISolone (Medrol Dospak) 4 mg tablets; Take as directed on package.  Mixed hyperlipidemia  Class 1 obesity due to excess calories with serious comorbidity and body mass index (BMI) of 32.0 to 32.9 in adult  -     Comprehensive  Metabolic Panel; Future  -     Lipid Panel; Future  -     TSH with reflex to Free T4 if abnormal; Future  -     CBC and Auto Differential; Future  -     Vitamin D 25-Hydroxy,Total (for eval of Vitamin D levels); Future  -     Hemoglobin A1C; Future  Immunization due  -     Flu vaccine, trivalent, preservative free, HIGH-DOSE, age 65y+ (Fluzone)  Encounter for screening mammogram for malignant neoplasm of breast  -     BI mammo bilateral screening tomosynthesis; Future  Tobacco use disorder  -     CT lung screening low dose; Future  Tobacco dependence due to cigarettes  -     CT lung screening low dose; Future  H/O: obesity  -     TSH with reflex to Free T4 if abnormal; Future  -     Hemoglobin A1C; Future         Inflammatory Arthritis   She initially felt much better with the medications, less synovitis   hydroxychloroquine 200mg BID  followed by rheum with Dr. Mirza  advised annual vision screening     Weight Gain   Offered nutritionist   Encouraged more movement and activity   Pt declined at this time.      HLD  ordered lipid panel      Tobacco Use  We did have an extensive discussion regarding smoking cessation with varies ways to help her quit, including gums, patches and Phamacological avenues. We also discussed proper diet that may improve his overall health and well being. WE advised the patient to make healthy food choices and trial a largely plant based diet. He was advised to avoid processed foods and sweets.        Health Maintenance  Mammogram ordered  influenza vaccine given today   Dexa   CT chest due 10/21  colon ca screenin/24 fit testing completed      Health Maintenance   Topic Date Due   • Medicare Annual Wellness Visit (AWV)  Never done   • Hepatitis C Screening  Never done   • Hepatitis A Vaccines (1 of 2 - Risk 2-dose series) Never done   • Zoster Vaccines (1 of 2) Never done   • RSV Pregnant patients and/or  patients aged 60+ years (1 - 1-dose 60+ series) Never done   • DTaP/Tdap/Td  Vaccines (2 - Td or Tdap) 10/08/2023   • Influenza Vaccine (1) 09/01/2024   • COVID-19 Vaccine (5 - 2023-24 season) 09/01/2024   • Lipid Panel  09/25/2028   • Pneumococcal Vaccine: 65+ Years  Completed   • Bone Density Scan  Completed   • HIB Vaccines  Aged Out   • Hepatitis B Vaccines  Aged Out   • IPV Vaccines  Aged Out   • Meningococcal Vaccine  Aged Out   • Rotavirus Vaccines  Aged Out   • HPV Vaccines  Aged Out   • Colorectal Cancer Screening  Discontinued   • Irritable Bowel Syndrome  Discontinued            Lisa Washington, DO

## 2024-10-21 ENCOUNTER — EVALUATION (OUTPATIENT)
Dept: PHYSICAL THERAPY | Facility: CLINIC | Age: 74
End: 2024-10-21
Payer: MEDICARE

## 2024-10-21 DIAGNOSIS — G89.29 CHRONIC LEFT-SIDED LOW BACK PAIN WITHOUT SCIATICA: Primary | ICD-10-CM

## 2024-10-21 DIAGNOSIS — M54.32 SCIATICA OF LEFT SIDE: ICD-10-CM

## 2024-10-21 DIAGNOSIS — M54.50 CHRONIC LEFT-SIDED LOW BACK PAIN WITHOUT SCIATICA: Primary | ICD-10-CM

## 2024-10-21 PROCEDURE — 97110 THERAPEUTIC EXERCISES: CPT | Mod: GP

## 2024-10-21 PROCEDURE — 97161 PT EVAL LOW COMPLEX 20 MIN: CPT | Mod: GP

## 2024-10-21 ASSESSMENT — ENCOUNTER SYMPTOMS
OCCASIONAL FEELINGS OF UNSTEADINESS: 1
LOSS OF SENSATION IN FEET: 0
DEPRESSION: 0

## 2024-10-21 ASSESSMENT — PAIN SCALES - GENERAL: PAINLEVEL_OUTOF10: 6

## 2024-10-21 ASSESSMENT — PAIN - FUNCTIONAL ASSESSMENT: PAIN_FUNCTIONAL_ASSESSMENT: 0-10

## 2024-10-21 NOTE — PROGRESS NOTES
Physical Therapy  Physical Therapy Evaluation    Patient Name: Sabrina Alcazar  MRN: [id number]  Today's Date: 10/21/2024  Time Calculation  Start Time: 1050  Stop Time: 1134  Time Calculation (min): 44 min    Insurance:  Visit number: 1  Insurance Type: requires auth    General  Reason for visit: General  Reason for Referral: low back pain  Referred By: Felix Delgado Comment: pt presents to PT clinic for low back pain, in August she has some sciatica but hasn't had any episodes since, she has been dealing with low back pain for years,    Current Problem  1. Chronic left-sided low back pain without sciatica  Follow Up In Physical Therapy      2. Sciatica of left side  Referral to Physical Therapy          Assessment:    Pt presents with low back pain that is worse with bending, lifting, and walking community distances. She displays impaired LLE strength, lumbar and hip ROM.  Her pattern of deficits and pain suggest lumbar facet irritation on L>R. She would benefit from skilled therapy to allow for an improvement in all deficits and return to PLOF at her desired intensity       Plan:   Treatment/Interventions: Cryotherapy, Dry needling, Education/ Instruction, Electrical stimulation, Hot pack, Manual therapy, Mechanical traction, Neuromuscular re-education, Self care/ home management, Taping techniques, Therapeutic activities, Therapeutic exercises  PT Frequency: 1 time per week  Duration: x12 weeks  Onset Date: 08/01/24    Precautions:   Precautions  STEADI Fall Risk Score (The score of 4 or more indicates an increased risk of falling): 3  Precautions Comment: none    Medical History Form: Reviewed (scanned into chart)    Subjective:   Onset Date: 08/01/24  IRMA: Chronic     Current Condition since injury:   same     Social Determinants of health: No    PAIN  Pain Assessment: 0-10  0-10 (Numeric) Pain Score: 6  Pain Type: Chronic pain  Pain Location: Back  Pain Orientation: Left  Aggravating Factors: bending over,  morning  Relieving Factors: Rest, Ice, and Heat     Relevant Information (PMH & Previous Tests/Imaging): none recent  Previous Interventions/Treatments: None     Prior Level of Function (PLOF)  Exercise/Physical Activity: walking, dancing,   Work/School: retired  Current ADL/IADL Status: independent     Patients Living Environment: Reviewed and no concern    Primary Language: English    Pt goals for therapy: to reduce pain   Red Flags:   REVIEW OF SYSTEMS/ RED FLAGS  (-) osteoporosis  (-) Cough/ Sneeze   (+) balance difficulties/FALLS   (-) numbness/tingle  (-) weakness  (-) unremitting night pain   Sleep position:  (-) AM Stiffness           (-) Unexplained Wt. Loss  (-) h/o CA   (-) Pacemaker  (-) Bowel/Bladder            (-) saddle paresthesia    Objective:  FLOW SHEET  Lumbar Observation  Pelvis: innominate rotated R anteriorly  Observation:: forward flexed at hips    Lumbar AROM  Lumbar flexion: (60°): 25% restriction  Lumbar extension (25°): 50% restriction  Lumbar rotation right (30°): 25% restriction  Lumbar rotation left (30°): 25% restriction  Lumbar sidebend right (25°): 25% restriction  Lumbar sidebend left (25°): 25% restriction    Hip AROM  R Hip Flexion (125°): 120  L Hip Flexion (125°): 120  R Hip Extension (10°): 0  L Hip Extension (10°): -20  R hip ER: (45°): 45  L hip ER: (45°): 30  R hip IR: (45°): 30  L hip IR: (45°): 30    Lumbar Myotomes  R Hip Flex : 5/5  L Hip Flex (L2): (5/5): 4/5  R Knee Ext (L3): (5/5): 5/5  L Knee Ext (L3): (5/5) : 4+/  R Ankle DF (L4): (5/5): 5/5  L Ankle DF (L4): (5/5): 4+/5    Specific Lower Extremity MMT  R Iliopsoas: (5/5): 3/5  L Iliopsoas: (5/5): 3/5  R Gluteals (prone): (5/5): 3+/5  L Gluteals (prone): (5/5): 3+/    Dermatomes  Dermatomes WFL: yes    Special Tests  Other: HS 90/90 R-10, L -20    Lumbar Melissa Assessment  Lumbar Melissa Assessmnet: negative reporduction of symptoms with repeated motions    Oswestry Disablity Index (ILDA): 40% self reported  "disability        EDUCATION: home exercise program, plan of care, activity modifications, pain management, and injury pathology       Goals:  Active       PT Problem       report 25% decrease in pain intensity in order to complete work tasks with greater ease        Start:  10/21/24    Expected End:  01/19/25            increased ROM of L hip 10 degrees in order to improve gait mechanics        Start:  10/21/24    Expected End:  01/19/25            demonstrate 2/3 a muscle grade strength increase in  LLE in order to complete complete stairs easier        Start:  10/21/24    Expected End:  01/19/25            .Pt will improve ILDA score by 10% to demonstrate in order to show increased functional mobility        Start:  10/21/24    Expected End:  01/19/25            report GROC +3 or greater (MCID) in order to show self perceived improvement with therapy        Start:  10/21/24    Expected End:  01/19/25                Plan of care was developed with input and agreement by the patient    Potential to achieve goals:  Good    Treatments:   This therapist instructed and demonstrated interventions to patient, patient completed the following under direct supervision of this therapist:.  Therapeutic Exercise:   min  19 MINUTES  Therapeutic Exercise  Therapeutic Exercise Activity 1: hooklying hip adduction ISO holds 5x10\"  Therapeutic Exercise Activity 2: hooklying hip abduction ISO holds 5x10\"  Therapeutic Exercise Activity 3: glute bridges  Therapeutic Exercise Activity 4: clamshells      PT Evaluation Time Entry  PT Evaluation (Low) Time Entry: 25    Praneeth Shea, PT    Access Code: [email or web address]  URL: [email or web address]  Date: 10/21/2024  Prepared by: Praneeth Shea    Exercises  - Supine Bridge  - 1-2 x daily - 7 x weekly - 3 sets - 15-30 reps  - Clamshell  - 1-2 x daily - 7 x weekly - 3 sets - 15-30 reps  - Supine Hip Adduction Isometric with Ball  - 1-2 x daily - 7 x weekly - 3 sets - 10 reps " "- 10-15\" hold  - Hooklying Isometric Clamshell  - 1-2 x daily - 7 x weekly - 3 sets - 10 reps - 10-15 hold    "

## 2024-10-21 NOTE — Clinical Note
October 21, 2024    Praneeth Shea, PT  [address] [facility], [address]  [place] [address]    Patient: Sabrina Alcazar   YOB: 1950   Date of Visit: 10/21/2024       Dear Lisa Washington DO  [address] [facility]  [address]  [place] [address]    The attached plan of care is being sent to you because your patient’s medical reimbursement requires that you certify the plan of care. Your signature is required to allow uninterrupted insurance coverage.      You may indicate your approval by signing below and faxing this form back to us at Dept Fax: [phone].    Please call Dept: [phone] with any questions or concerns.    Thank you for this referral,        Praneeth Shea PT  [facility] [address] [facility]  [address] [facility] [address]    Payer: Payor: [employer] / Plan: [facility] MEDICARE ADVANTAGE / Product Type: *No Product type* /                                                                         Date:     Dear Praneeth Seha PT,     Re: Ms. Sabrina Alcazar, MRN:[id number]    I certify that I have reviewed the attached plan of care and it is medically necessary for Ms. Sabrina Alcazar (1950) who is under my care.          ______________________________________                    _________________  Provider name and credentials                                           Date and time                                                                                           Plan of Care 10/21/24   Effective from: 10/21/2024  Effective to: 1/19/2025    Plan ID: [id number]            Participants as of Finalize on 10/21/2024    Name Type Comments Contact Info    Lisa Washington DO PCP - General  [phone]    Praneeth Shea PT Physical Therapist  [phone]       Last Plan Note     Author: Praneeth Shea PT Status: Incomplete Last edited: 10/21/2024 11:00 AM           Physical Therapy  Physical Therapy Evaluation    Patient Name: Sabrina Alcazar  MRN:  [id number]  Today's Date: 10/21/2024  Time Calculation  Start Time: 1050  Stop Time: 1134  Time Calculation (min): 44 min    Insurance:  Visit number: 1  Insurance Type: requires auth    General  Reason for visit: General  Reason for Referral: low back pain  Referred By: Felix Delgado Comment: pt presents to PT clinic for low back pain, in August she has some sciatica but hasn't had any episodes since, she has been dealing with low back pain for years,    Current Problem  1. Chronic left-sided low back pain without sciatica  Follow Up In Physical Therapy      2. Sciatica of left side  Referral to Physical Therapy          Assessment:    Pt presents with low back pain that is worse with bending, lifting, and walking community distances. She displays impaired LLE strength, lumbar and hip ROM.  Her pattern of deficits and pain suggest lumbar facet irritation on L>R. She would benefit from skilled therapy to allow for an improvement in all deficits and return to PLOF at her desired intensity       Plan:   Treatment/Interventions: Cryotherapy, Dry needling, Education/ Instruction, Electrical stimulation, Hot pack, Manual therapy, Mechanical traction, Neuromuscular re-education, Self care/ home management, Taping techniques, Therapeutic activities, Therapeutic exercises  PT Frequency: 1 time per week  Duration: x12 weeks  Onset Date: 08/01/24    Precautions:   Precautions  STEADI Fall Risk Score (The score of 4 or more indicates an increased risk of falling): 3  Precautions Comment: none    Medical History Form: Reviewed (scanned into chart)    Subjective:   Onset Date: 08/01/24  IRMA: Chronic     Current Condition since injury:   same     Social Determinants of health: No    PAIN  Pain Assessment: 0-10  0-10 (Numeric) Pain Score: 6  Pain Type: Chronic pain  Pain Location: Back  Pain Orientation: Left  Aggravating Factors: bending over, morning  Relieving Factors: Rest, Ice, and Heat     Relevant Information (PMH & Previous  Tests/Imaging): none recent  Previous Interventions/Treatments: None     Prior Level of Function (PLOF)  Exercise/Physical Activity: walking, dancing,   Work/School: retired  Current ADL/IADL Status: independent     Patients Living Environment: Reviewed and no concern    Primary Language: English    Pt goals for therapy: to reduce pain   Red Flags:   REVIEW OF SYSTEMS/ RED FLAGS  (-) osteoporosis  (-) Cough/ Sneeze   (+) balance difficulties/FALLS   (-) numbness/tingle  (-) weakness  (-) unremitting night pain   Sleep position:  (-) AM Stiffness           (-) Unexplained Wt. Loss  (-) h/o CA   (-) Pacemaker  (-) Bowel/Bladder            (-) saddle paresthesia    Objective:  FLOW SHEET  Lumbar Observation  Pelvis: innominate rotated R anteriorly  Observation:: forward flexed at hips    Lumbar AROM  Lumbar flexion: (60°): 25% restriction  Lumbar extension (25°): 50% restriction  Lumbar rotation right (30°): 25% restriction  Lumbar rotation left (30°): 25% restriction  Lumbar sidebend right (25°): 25% restriction  Lumbar sidebend left (25°): 25% restriction    Hip AROM  R Hip Flexion (125°): 120  L Hip Flexion (125°): 120  R Hip Extension (10°): 0  L Hip Extension (10°): -20  R hip ER: (45°): 45  L hip ER: (45°): 30  R hip IR: (45°): 30  L hip IR: (45°): 30    Lumbar Myotomes  R Hip Flex : 5/5  L Hip Flex (L2): (5/5): 4/5  R Knee Ext (L3): (5/5): 5/5  L Knee Ext (L3): (5/5) : 4+/  R Ankle DF (L4): (5/5): 5/5  L Ankle DF (L4): (5/5): 4+/5    Specific Lower Extremity MMT  R Iliopsoas: (5/5): 3/5  L Iliopsoas: (5/5): 3/5  R Gluteals (prone): (5/5): 3+/5  L Gluteals (prone): (5/5): 3+/    Dermatomes  Dermatomes WFL: yes    Special Tests  Other: HS 90/90 R-10, L -20    Lumbar Melissa Assessment  Lumbar Melissa Assessmnet: negative reporduction of symptoms with repeated motions    Oswestry Disablity Index (ILDA): 40% self reported disability        EDUCATION: home exercise program, plan of care, activity modifications, pain  "management, and injury pathology       Goals:  Active       PT Problem       report 25% decrease in pain intensity in order to complete work tasks with greater ease        Start:  10/21/24    Expected End:  01/19/25            increased ROM of L hip 10 degrees in order to improve gait mechanics        Start:  10/21/24    Expected End:  01/19/25            demonstrate 2/3 a muscle grade strength increase in  LLE in order to complete complete stairs easier        Start:  10/21/24    Expected End:  01/19/25            .Pt will improve ILDA score by 10% to demonstrate in order to show increased functional mobility        Start:  10/21/24    Expected End:  01/19/25            report GROC +3 or greater (MCID) in order to show self perceived improvement with therapy        Start:  10/21/24    Expected End:  01/19/25                Plan of care was developed with input and agreement by the patient    Potential to achieve goals:  Good    Treatments:   This therapist instructed and demonstrated interventions to patient, patient completed the following under direct supervision of this therapist:.  Therapeutic Exercise:   min  19 MINUTES  Therapeutic Exercise  Therapeutic Exercise Activity 1: hooklying hip adduction ISO holds 5x10\"  Therapeutic Exercise Activity 2: hooklying hip abduction ISO holds 5x10\"  Therapeutic Exercise Activity 3: glute bridges  Therapeutic Exercise Activity 4: clamshells      PT Evaluation Time Entry  PT Evaluation (Low) Time Entry: 25    Praneeth Shea, PT    Access Code: [email or web address]  URL: [email or web address]  Date: 10/21/2024  Prepared by: Praneeth Shea    Exercises  - Supine Bridge  - 1-2 x daily - 7 x weekly - 3 sets - 15-30 reps  - Clamshell  - 1-2 x daily - 7 x weekly - 3 sets - 15-30 reps  - Supine Hip Adduction Isometric with Ball  - 1-2 x daily - 7 x weekly - 3 sets - 10 reps - 10-15\" hold  - Hooklying Isometric Clamshell  - 1-2 x daily - 7 x weekly - 3 sets - 10 reps " - 10-15 hold           Current Participants as of 10/21/2024    Name Type Comments Contact Info    Lisa Washington DO PCP - General  [phone]    Signature pending    Praneeth Shea PT Physical Therapist  [phone]    Signature pending

## 2024-10-26 ENCOUNTER — HOSPITAL ENCOUNTER (OUTPATIENT)
Dept: RADIOLOGY | Facility: HOSPITAL | Age: 74
Discharge: HOME | End: 2024-10-26
Payer: MEDICARE

## 2024-10-26 DIAGNOSIS — F17.200 TOBACCO USE DISORDER: ICD-10-CM

## 2024-10-26 DIAGNOSIS — F17.210 TOBACCO DEPENDENCE DUE TO CIGARETTES: ICD-10-CM

## 2024-10-26 PROCEDURE — 71271 CT THORAX LUNG CANCER SCR C-: CPT | Performed by: RADIOLOGY

## 2024-10-26 PROCEDURE — 71271 CT THORAX LUNG CANCER SCR C-: CPT

## 2024-10-28 DIAGNOSIS — F17.200 TOBACCO USE DISORDER: ICD-10-CM

## 2024-10-28 DIAGNOSIS — Z86.39 H/O: OBESITY: Primary | ICD-10-CM

## 2024-10-28 DIAGNOSIS — E78.2 MIXED HYPERLIPIDEMIA: ICD-10-CM

## 2024-11-04 ENCOUNTER — TREATMENT (OUTPATIENT)
Dept: PHYSICAL THERAPY | Facility: CLINIC | Age: 74
End: 2024-11-04
Payer: MEDICARE

## 2024-11-04 DIAGNOSIS — G89.29 CHRONIC LEFT-SIDED LOW BACK PAIN WITHOUT SCIATICA: ICD-10-CM

## 2024-11-04 DIAGNOSIS — M54.50 CHRONIC LEFT-SIDED LOW BACK PAIN WITHOUT SCIATICA: ICD-10-CM

## 2024-11-04 PROCEDURE — 97110 THERAPEUTIC EXERCISES: CPT | Mod: GP | Performed by: PHYSICAL THERAPIST

## 2024-11-04 ASSESSMENT — PAIN SCALES - GENERAL: PAINLEVEL_OUTOF10: 4

## 2024-11-04 ASSESSMENT — PAIN - FUNCTIONAL ASSESSMENT: PAIN_FUNCTIONAL_ASSESSMENT: 0-10

## 2024-11-04 NOTE — PROGRESS NOTES
"    Physical Therapy  Physical Therapy Treatment    Patient Name: Sabrina Alcazar  MRN: [id number]  Today's Date: 11/4/2024  Time Calculation  Start Time: 1135  Stop Time: 1215  Time Calculation (min): 40 min    General  Reason for Referral: low back pain  Referred By: Felix Delgado Comment: Visit 2: \"I am feeling better then the first time.\"  Assessment:    Patient fatigued with therapeutic exercises on the Left LE this date, but only some residual muscle soreness in the gluteals and no noted radicular pains.   Plan:  Progress as tolerated     Active       PT Problem       report 25% decrease in pain intensity in order to complete work tasks with greater ease        Start:  10/21/24    Expected End:  01/19/25            increased ROM of L hip 10 degrees in order to improve gait mechanics        Start:  10/21/24    Expected End:  01/19/25            demonstrate 2/3 a muscle grade strength increase in  LLE in order to complete complete stairs easier        Start:  10/21/24    Expected End:  01/19/25            .Pt will improve ILDA score by 10% to demonstrate in order to show increased functional mobility        Start:  10/21/24    Expected End:  01/19/25            report GROC +3 or greater (MCID) in order to show self perceived improvement with therapy        Start:  10/21/24    Expected End:  01/19/25                General  Chief Complaint:   1. Chronic left-sided low back pain without sciatica  Follow Up In Physical Therapy          Subjective:     Current Problem  General  Reason for Referral: low back pain  Referred By: Felix Delgado Comment: Visit 2: \"I am feeling better then the first time.\"    Precautions  Precautions Comment: none    Patient perform today's treatment with generalized left hip muscle fatigue      Pain  Pain Assessment: 0-10  0-10 (Numeric) Pain Score: 4  Pain Type: Chronic pain  Pain Location: Back  Pain Orientation: Left    Objective:     Treatments:   This therapist instructed and demonstrated " "interventions to patient, patient completed the following under direct supervision of this therapist:    38 minutes  Therapeutic Exercise  Therapeutic Exercise Performed: Yes  Therapeutic Exercise Activity 1: Scifit: seat 9, level 1, 4'  Therapeutic Exercise Activity 2: seated sball flexion stretch: 3 x 20\" with green ball  Therapeutic Exercise Activity 3: Supine: Piriformis stretch: Left 3 x 20\"  Therapeutic Exercise Activity 4: Hip abduction with green Tband: 15 x  Therapeutic Exercise Activity 5: bridges: 10 x 3\"  Therapeutic Exercise Activity 6: Side lying Right: Clamshells: 20 x  Therapeutic Exercise Activity 7: seated: HS curl: green 2 x 15  Therapeutic Exercise Activity 8: Hip flexion/march: Left 3 x 10        Education:      Ebenezer Stevenson, PT  "

## 2024-11-26 ENCOUNTER — APPOINTMENT (OUTPATIENT)
Dept: PHYSICAL THERAPY | Facility: CLINIC | Age: 74
End: 2024-11-26
Payer: MEDICARE

## 2024-12-20 ENCOUNTER — DOCUMENTATION (OUTPATIENT)
Dept: PHYSICAL THERAPY | Facility: CLINIC | Age: 74
End: 2024-12-20
Payer: MEDICARE

## 2024-12-20 NOTE — PROGRESS NOTES
Physical Therapy    Discharge Summary    Name: Sabrina Alcazar  MRN: [id number]  : 1950  Date: 24    Discharge Summary: PT    Discharge Information: Date of discharge 2024, Date of last visit 2024, Date of evaluation 10/21/2024, Number of attended visits 2, Referred by Ray, and Referred for low back pain    Therapy Summary: pt only attended 2 visits    Discharge Status: unknown     Rehab Discharge Reason: Failed to schedule and/or keep follow-up appointment(s)

## 2024-12-24 DIAGNOSIS — M06.4 UNDIFFERENTIATED INFLAMMATORY ARTHRITIS (MULTI): ICD-10-CM

## 2024-12-24 RX ORDER — HYDROXYCHLOROQUINE SULFATE 200 MG/1
TABLET, FILM COATED ORAL 2 TIMES DAILY
Qty: 60 TABLET | Refills: 2 | Status: SHIPPED | OUTPATIENT
Start: 2024-12-24

## 2025-02-14 ENCOUNTER — APPOINTMENT (OUTPATIENT)
Dept: RADIOLOGY | Facility: HOSPITAL | Age: 75
End: 2025-02-14
Payer: MEDICARE

## 2025-02-24 DIAGNOSIS — M06.4 UNDIFFERENTIATED INFLAMMATORY ARTHRITIS (MULTI): ICD-10-CM

## 2025-02-24 RX ORDER — HYDROXYCHLOROQUINE SULFATE 200 MG/1
200 TABLET, FILM COATED ORAL 2 TIMES DAILY
Qty: 60 TABLET | Refills: 3 | Status: SHIPPED | OUTPATIENT
Start: 2025-02-24

## 2025-05-14 ENCOUNTER — HOSPITAL ENCOUNTER (OUTPATIENT)
Dept: RADIOLOGY | Facility: HOSPITAL | Age: 75
Discharge: HOME | End: 2025-05-14
Payer: MEDICARE

## 2025-05-14 DIAGNOSIS — E78.2 MIXED HYPERLIPIDEMIA: ICD-10-CM

## 2025-05-14 DIAGNOSIS — F17.200 TOBACCO USE DISORDER: ICD-10-CM

## 2025-05-14 DIAGNOSIS — Z86.39 H/O: OBESITY: ICD-10-CM

## 2025-05-14 PROCEDURE — 75571 CT HRT W/O DYE W/CA TEST: CPT

## 2025-06-23 ENCOUNTER — OFFICE VISIT (OUTPATIENT)
Dept: RHEUMATOLOGY | Facility: CLINIC | Age: 75
End: 2025-06-23
Payer: MEDICARE

## 2025-06-23 VITALS — DIASTOLIC BLOOD PRESSURE: 70 MMHG | SYSTOLIC BLOOD PRESSURE: 130 MMHG | WEIGHT: 181 LBS | BODY MASS INDEX: 32.06 KG/M2

## 2025-06-23 DIAGNOSIS — L40.0 PSORIASIS VULGARIS: ICD-10-CM

## 2025-06-23 DIAGNOSIS — M19.90 OSTEOARTHRITIS, UNSPECIFIED OSTEOARTHRITIS TYPE, UNSPECIFIED SITE: ICD-10-CM

## 2025-06-23 DIAGNOSIS — M19.90 UNDIFFERENTIATED INFLAMMATORY ARTHRITIS: Primary | ICD-10-CM

## 2025-06-23 PROCEDURE — 1123F ACP DISCUSS/DSCN MKR DOCD: CPT | Performed by: INTERNAL MEDICINE

## 2025-06-23 PROCEDURE — 99214 OFFICE O/P EST MOD 30 MIN: CPT | Performed by: INTERNAL MEDICINE

## 2025-06-23 PROCEDURE — 1159F MED LIST DOCD IN RCRD: CPT | Performed by: INTERNAL MEDICINE

## 2025-06-23 RX ORDER — HYDROXYCHLOROQUINE SULFATE 200 MG/1
200 TABLET, FILM COATED ORAL 2 TIMES DAILY
Qty: 60 TABLET | Refills: 3 | Status: SHIPPED | OUTPATIENT
Start: 2025-06-23

## 2025-06-23 NOTE — PROGRESS NOTES
"Recheck  OA  /  Inflammatory Arthritis  Doing well with exception of increased achiness with weather changes.    HPI - She had a \"sciatica attack\" last yr with pain in her buttock that rad down the leg.  She had PT a few times and then continued the exercises at home, which helps.  Her L leg sometimes feels a little uncomfortable.  ?numbness/tingling.  No swelling.  AM stiffness x 5 min.   No CP.  Sometimes ARREDONDO with steps.  No GI.  She said that someone looked on the internet, and he saw that hcq helps the inflammation, but it \"hurts the good.\"  She isn't using anything for her psoriasis on a regular basis, and it's unclear what she puts on if it's troublesome    PE  NAD  RRR no r/m/g  CTA  No edema  No synovitis  Ps plaque on elbow and L shin    A/P - OA and inflam arthritis, PsA in ddx but no inflammation seen at this time  She has sciatica sx that responded some to PT.  Consider pain mgmt if sx incr - she declines at this time  Back to derm if Ps incr  Reassured pt about hcq and reiterated yearly eye exam  Reviewed prev labs  No need to check labs today  Nl DEXA 8/23  Follow up 6 mo or sooner PRN    "

## 2025-08-26 ENCOUNTER — TELEPHONE (OUTPATIENT)
Dept: PRIMARY CARE | Facility: CLINIC | Age: 75
End: 2025-08-26
Payer: MEDICARE

## 2025-10-01 ENCOUNTER — APPOINTMENT (OUTPATIENT)
Dept: PRIMARY CARE | Facility: CLINIC | Age: 75
End: 2025-10-01
Payer: MEDICARE